# Patient Record
Sex: MALE | Race: WHITE | Employment: OTHER | URBAN - METROPOLITAN AREA
[De-identification: names, ages, dates, MRNs, and addresses within clinical notes are randomized per-mention and may not be internally consistent; named-entity substitution may affect disease eponyms.]

---

## 2017-03-08 PROBLEM — N17.9 AKI (ACUTE KIDNEY INJURY) (HCC): Status: ACTIVE | Noted: 2017-03-08
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
## 2017-03-08 PROBLEM — M62.82 RHABDOMYOLYSIS: Status: ACTIVE | Noted: 2017-03-08
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
## 2019-08-26 ENCOUNTER — APPOINTMENT (OUTPATIENT)
Dept: CT IMAGING | Age: 84
DRG: 309 | End: 2019-08-26
Payer: COMMERCIAL

## 2019-08-26 ENCOUNTER — HOSPITAL ENCOUNTER (INPATIENT)
Age: 84
LOS: 3 days | Discharge: NURSING, REHAB OR LONG-TERM CARE | DRG: 309 | End: 2019-08-29
Attending: EMERGENCY MEDICINE | Admitting: INTERNAL MEDICINE
Payer: COMMERCIAL

## 2019-08-26 DIAGNOSIS — R91.1 PULMONARY NODULE: ICD-10-CM
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
## 2019-08-26 DIAGNOSIS — M47.812 OSTEOARTHRITIS OF CERVICAL SPINE, UNSPECIFIED SPINAL OSTEOARTHRITIS COMPLICATION STATUS: ICD-10-CM

## 2019-08-26 DIAGNOSIS — K76.9 LIVER LESION: ICD-10-CM

## 2019-08-26 DIAGNOSIS — R11.2 NAUSEA AND VOMITING, INTRACTABILITY OF VOMITING NOT SPECIFIED, UNSPECIFIED VOMITING TYPE: ICD-10-CM

## 2019-08-26 DIAGNOSIS — Z86.59 HISTORY OF DEMENTIA: ICD-10-CM

## 2019-08-26 DIAGNOSIS — I48.91 ATRIAL FIBRILLATION WITH RAPID VENTRICULAR RESPONSE (HCC): Primary | ICD-10-CM

## 2019-08-26 PROBLEM — N17.9 AKI (ACUTE KIDNEY INJURY) (HCC): Status: RESOLVED | Noted: 2017-03-08 | Resolved: 2019-08-26

## 2019-08-26 PROBLEM — Z86.73 HISTORY OF CVA (CEREBROVASCULAR ACCIDENT): Chronic | Status: ACTIVE | Noted: 2019-08-26

## 2019-08-26 PROBLEM — N18.30 CKD (CHRONIC KIDNEY DISEASE) STAGE 3, GFR 30-59 ML/MIN (HCC): Chronic | Status: ACTIVE | Noted: 2019-08-26

## 2019-08-26 PROBLEM — M62.82 RHABDOMYOLYSIS: Status: RESOLVED | Noted: 2017-03-08 | Resolved: 2019-08-26

## 2019-08-26 PROBLEM — E03.9 ACQUIRED HYPOTHYROIDISM: Chronic | Status: ACTIVE | Noted: 2019-08-26

## 2019-08-26 PROBLEM — I10 ESSENTIAL HYPERTENSION: Chronic | Status: ACTIVE | Noted: 2019-08-26

## 2019-08-26 PROBLEM — R16.0 LIVER MASS: Status: ACTIVE | Noted: 2019-08-26

## 2019-08-26 LAB
ALBUMIN SERPL-MCNC: 4.1 G/DL (ref 3.5–5.2)
ALP BLD-CCNC: 115 U/L (ref 40–129)
ALT SERPL-CCNC: 22 U/L (ref 0–40)
ANION GAP SERPL CALCULATED.3IONS-SCNC: 14 MMOL/L (ref 7–16)
AST SERPL-CCNC: 36 U/L (ref 0–39)
BACTERIA: ABNORMAL /HPF
BASOPHILS ABSOLUTE: 0.06 E9/L (ref 0–0.2)
BASOPHILS RELATIVE PERCENT: 0.5 % (ref 0–2)
BILIRUB SERPL-MCNC: 0.8 MG/DL (ref 0–1.2)
BILIRUBIN URINE: NEGATIVE
BLOOD, URINE: ABNORMAL
BUN BLDV-MCNC: 26 MG/DL (ref 8–23)
CALCIUM SERPL-MCNC: 11.6 MG/DL (ref 8.6–10.2)
CHLORIDE BLD-SCNC: 96 MMOL/L (ref 98–107)
CHP ED QC CHECK: YES
CLARITY: CLEAR
CO2: 25 MMOL/L (ref 22–29)
COLOR: YELLOW
CREAT SERPL-MCNC: 1.2 MG/DL (ref 0.7–1.2)
EKG ATRIAL RATE: 131 BPM
EKG Q-T INTERVAL: 334 MS
EKG QRS DURATION: 100 MS
EKG QTC CALCULATION (BAZETT): 511 MS
EKG R AXIS: -40 DEGREES
EKG T AXIS: 73 DEGREES
EKG VENTRICULAR RATE: 141 BPM
EOSINOPHILS ABSOLUTE: 0.02 E9/L (ref 0.05–0.5)
EOSINOPHILS RELATIVE PERCENT: 0.2 % (ref 0–6)
GFR AFRICAN AMERICAN: >60
GFR NON-AFRICAN AMERICAN: 56 ML/MIN/1.73
GLUCOSE BLD-MCNC: 159 MG/DL
GLUCOSE BLD-MCNC: 179 MG/DL (ref 74–99)
GLUCOSE URINE: NEGATIVE MG/DL
HCT VFR BLD CALC: 40.5 % (ref 37–54)
HEMOGLOBIN: 13.5 G/DL (ref 12.5–16.5)
IMMATURE GRANULOCYTES #: 0.09 E9/L
IMMATURE GRANULOCYTES %: 0.7 % (ref 0–5)
KETONES, URINE: NEGATIVE MG/DL
LACTIC ACID: 1.7 MMOL/L (ref 0.5–2.2)
LACTIC ACID: 2.3 MMOL/L (ref 0.5–2.2)
LEUKOCYTE ESTERASE, URINE: NEGATIVE
LYMPHOCYTES ABSOLUTE: 1.25 E9/L (ref 1.5–4)
LYMPHOCYTES RELATIVE PERCENT: 9.7 % (ref 20–42)
MCH RBC QN AUTO: 29.8 PG (ref 26–35)
MCHC RBC AUTO-ENTMCNC: 33.3 % (ref 32–34.5)
MCV RBC AUTO: 89.4 FL (ref 80–99.9)
METER GLUCOSE: 159 MG/DL (ref 74–99)
MONOCYTES ABSOLUTE: 0.63 E9/L (ref 0.1–0.95)
MONOCYTES RELATIVE PERCENT: 4.9 % (ref 2–12)
NEUTROPHILS ABSOLUTE: 10.85 E9/L (ref 1.8–7.3)
NEUTROPHILS RELATIVE PERCENT: 84 % (ref 43–80)
NITRITE, URINE: NEGATIVE
PDW BLD-RTO: 15.9 FL (ref 11.5–15)
PH UA: 7 (ref 5–9)
PLATELET # BLD: 285 E9/L (ref 130–450)
PMV BLD AUTO: 10.9 FL (ref 7–12)
POTASSIUM SERPL-SCNC: 3.9 MMOL/L (ref 3.5–5)
PRO-BNP: 552 PG/ML (ref 0–450)
PROTEIN UA: 30 MG/DL
RBC # BLD: 4.53 E12/L (ref 3.8–5.8)
RBC UA: ABNORMAL /HPF (ref 0–2)
SODIUM BLD-SCNC: 135 MMOL/L (ref 132–146)
SPECIFIC GRAVITY UA: 1.01 (ref 1–1.03)
T3 FREE: 2.3 PG/ML (ref 2–4.4)
T4 FREE: 1.8 NG/DL (ref 0.93–1.7)
TOTAL CK: 140 U/L (ref 20–200)
TOTAL PROTEIN: 7.5 G/DL (ref 6.4–8.3)
TROPONIN: 0.03 NG/ML (ref 0–0.03)
TROPONIN: 0.04 NG/ML (ref 0–0.03)
TROPONIN: <0.01 NG/ML (ref 0–0.03)
TSH SERPL DL<=0.05 MIU/L-ACNC: 2.4 UIU/ML (ref 0.27–4.2)
UROBILINOGEN, URINE: 0.2 E.U./DL
WBC # BLD: 12.9 E9/L (ref 4.5–11.5)
WBC UA: ABNORMAL /HPF (ref 0–5)

## 2019-08-26 PROCEDURE — 85025 COMPLETE CBC W/AUTO DIFF WBC: CPT

## 2019-08-26 PROCEDURE — 81001 URINALYSIS AUTO W/SCOPE: CPT

## 2019-08-26 PROCEDURE — 6360000002 HC RX W HCPCS: Performed by: EMERGENCY MEDICINE

## 2019-08-26 PROCEDURE — 84443 ASSAY THYROID STIM HORMONE: CPT

## 2019-08-26 PROCEDURE — 99291 CRITICAL CARE FIRST HOUR: CPT

## 2019-08-26 PROCEDURE — 83605 ASSAY OF LACTIC ACID: CPT

## 2019-08-26 PROCEDURE — 97161 PT EVAL LOW COMPLEX 20 MIN: CPT

## 2019-08-26 PROCEDURE — 36415 COLL VENOUS BLD VENIPUNCTURE: CPT

## 2019-08-26 PROCEDURE — 84481 FREE ASSAY (FT-3): CPT

## 2019-08-26 PROCEDURE — 96374 THER/PROPH/DIAG INJ IV PUSH: CPT

## 2019-08-26 PROCEDURE — 2500000003 HC RX 250 WO HCPCS: Performed by: EMERGENCY MEDICINE

## 2019-08-26 PROCEDURE — 72125 CT NECK SPINE W/O DYE: CPT

## 2019-08-26 PROCEDURE — 97530 THERAPEUTIC ACTIVITIES: CPT

## 2019-08-26 PROCEDURE — 74176 CT ABD & PELVIS W/O CONTRAST: CPT

## 2019-08-26 PROCEDURE — 97165 OT EVAL LOW COMPLEX 30 MIN: CPT

## 2019-08-26 PROCEDURE — 2580000003 HC RX 258: Performed by: EMERGENCY MEDICINE

## 2019-08-26 PROCEDURE — 71250 CT THORAX DX C-: CPT

## 2019-08-26 PROCEDURE — 2060000000 HC ICU INTERMEDIATE R&B

## 2019-08-26 PROCEDURE — APPSS60 APP SPLIT SHARED TIME 46-60 MINUTES: Performed by: NURSE PRACTITIONER

## 2019-08-26 PROCEDURE — 80053 COMPREHEN METABOLIC PANEL: CPT

## 2019-08-26 PROCEDURE — 84484 ASSAY OF TROPONIN QUANT: CPT

## 2019-08-26 PROCEDURE — 6370000000 HC RX 637 (ALT 250 FOR IP): Performed by: INTERNAL MEDICINE

## 2019-08-26 PROCEDURE — 70450 CT HEAD/BRAIN W/O DYE: CPT

## 2019-08-26 PROCEDURE — 93005 ELECTROCARDIOGRAM TRACING: CPT | Performed by: EMERGENCY MEDICINE

## 2019-08-26 PROCEDURE — 84439 ASSAY OF FREE THYROXINE: CPT

## 2019-08-26 PROCEDURE — 94760 N-INVAS EAR/PLS OXIMETRY 1: CPT

## 2019-08-26 PROCEDURE — 2580000003 HC RX 258: Performed by: INTERNAL MEDICINE

## 2019-08-26 PROCEDURE — 99222 1ST HOSP IP/OBS MODERATE 55: CPT | Performed by: EMERGENCY MEDICINE

## 2019-08-26 PROCEDURE — 99223 1ST HOSP IP/OBS HIGH 75: CPT | Performed by: INTERNAL MEDICINE

## 2019-08-26 PROCEDURE — 82550 ASSAY OF CK (CPK): CPT

## 2019-08-26 PROCEDURE — 93010 ELECTROCARDIOGRAM REPORT: CPT | Performed by: INTERNAL MEDICINE

## 2019-08-26 PROCEDURE — 87088 URINE BACTERIA CULTURE: CPT

## 2019-08-26 PROCEDURE — 83880 ASSAY OF NATRIURETIC PEPTIDE: CPT

## 2019-08-26 PROCEDURE — 6360000002 HC RX W HCPCS: Performed by: INTERNAL MEDICINE

## 2019-08-26 PROCEDURE — 82962 GLUCOSE BLOOD TEST: CPT

## 2019-08-26 PROCEDURE — 96375 TX/PRO/DX INJ NEW DRUG ADDON: CPT

## 2019-08-26 RX ORDER — IBUPROFEN 200 MG
200 TABLET ORAL EVERY 6 HOURS PRN
COMMUNITY

## 2019-08-26 RX ORDER — ACETAMINOPHEN 325 MG/1
650 TABLET ORAL EVERY 4 HOURS PRN
Status: DISCONTINUED | OUTPATIENT
Start: 2019-08-26 | End: 2019-08-29 | Stop reason: HOSPADM

## 2019-08-26 RX ORDER — ONDANSETRON 2 MG/ML
4 INJECTION INTRAMUSCULAR; INTRAVENOUS EVERY 6 HOURS PRN
Status: DISCONTINUED | OUTPATIENT
Start: 2019-08-26 | End: 2019-08-29 | Stop reason: HOSPADM

## 2019-08-26 RX ORDER — SODIUM CHLORIDE 0.9 % (FLUSH) 0.9 %
10 SYRINGE (ML) INJECTION EVERY 12 HOURS SCHEDULED
Status: DISCONTINUED | OUTPATIENT
Start: 2019-08-26 | End: 2019-08-29 | Stop reason: HOSPADM

## 2019-08-26 RX ORDER — METOPROLOL TARTRATE 50 MG/1
50 TABLET, FILM COATED ORAL 2 TIMES DAILY
Status: DISCONTINUED | OUTPATIENT
Start: 2019-08-26 | End: 2019-08-28

## 2019-08-26 RX ORDER — FUROSEMIDE 40 MG/1
40 TABLET ORAL DAILY
COMMUNITY

## 2019-08-26 RX ORDER — DILTIAZEM HYDROCHLORIDE 5 MG/ML
20 INJECTION INTRAVENOUS ONCE
Status: COMPLETED | OUTPATIENT
Start: 2019-08-26 | End: 2019-08-26

## 2019-08-26 RX ORDER — SODIUM CHLORIDE 0.9 % (FLUSH) 0.9 %
10 SYRINGE (ML) INJECTION PRN
Status: DISCONTINUED | OUTPATIENT
Start: 2019-08-26 | End: 2019-08-29 | Stop reason: HOSPADM

## 2019-08-26 RX ORDER — LEVOTHYROXINE SODIUM 0.07 MG/1
75 TABLET ORAL DAILY
Status: DISCONTINUED | OUTPATIENT
Start: 2019-08-26 | End: 2019-08-29 | Stop reason: HOSPADM

## 2019-08-26 RX ORDER — LEVOTHYROXINE SODIUM 0.12 MG/1
125 TABLET ORAL DAILY
COMMUNITY

## 2019-08-26 RX ORDER — ASPIRIN 81 MG/1
81 TABLET ORAL DAILY
Status: DISCONTINUED | OUTPATIENT
Start: 2019-08-26 | End: 2019-08-29 | Stop reason: HOSPADM

## 2019-08-26 RX ORDER — TRIAMCINOLONE ACETONIDE 1 MG/G
CREAM TOPICAL 2 TIMES DAILY
COMMUNITY

## 2019-08-26 RX ORDER — 0.9 % SODIUM CHLORIDE 0.9 %
500 INTRAVENOUS SOLUTION INTRAVENOUS ONCE
Status: COMPLETED | OUTPATIENT
Start: 2019-08-26 | End: 2019-08-26

## 2019-08-26 RX ORDER — ONDANSETRON 2 MG/ML
4 INJECTION INTRAMUSCULAR; INTRAVENOUS ONCE
Status: COMPLETED | OUTPATIENT
Start: 2019-08-26 | End: 2019-08-26

## 2019-08-26 RX ADMIN — SODIUM CHLORIDE 500 ML: 9 INJECTION, SOLUTION INTRAVENOUS at 07:58

## 2019-08-26 RX ADMIN — ENOXAPARIN SODIUM 80 MG: 80 INJECTION SUBCUTANEOUS at 13:22

## 2019-08-26 RX ADMIN — DILTIAZEM HYDROCHLORIDE 5 MG/HR: 5 INJECTION INTRAVENOUS at 10:45

## 2019-08-26 RX ADMIN — METOPROLOL TARTRATE 50 MG: 50 TABLET ORAL at 19:54

## 2019-08-26 RX ADMIN — METOPROLOL TARTRATE 50 MG: 50 TABLET ORAL at 13:22

## 2019-08-26 RX ADMIN — ONDANSETRON 4 MG: 2 INJECTION INTRAMUSCULAR; INTRAVENOUS at 08:04

## 2019-08-26 RX ADMIN — Medication 10 ML: at 19:54

## 2019-08-26 RX ADMIN — ENOXAPARIN SODIUM 80 MG: 80 INJECTION SUBCUTANEOUS at 19:54

## 2019-08-26 RX ADMIN — DILTIAZEM HYDROCHLORIDE 20 MG: 5 INJECTION INTRAVENOUS at 09:45

## 2019-08-26 RX ADMIN — LEVOTHYROXINE SODIUM 75 MCG: 75 TABLET ORAL at 13:22

## 2019-08-26 ASSESSMENT — PAIN SCALES - GENERAL
PAINLEVEL_OUTOF10: 0
PAINLEVEL_OUTOF10: 0

## 2019-08-26 NOTE — PROGRESS NOTES
Database complete. Medications reconciled. Care plans and education initiated. Uses cane for assistive device normally.

## 2019-08-26 NOTE — CARE COORDINATION
Social work / Discharge Planning:        Patient is from [address] Awaiting PT/OT evaluations to determine if patient can return to AL level of care. Social work will follow with CM.   Electronically signed by FERMIN Yin on 8/26/2019 at 2:57 PM

## 2019-08-26 NOTE — PROGRESS NOTES
Patient reports he has assist as needed with ADLs. Reoriented to place and situation. Ambulated to/from bathroom Min A,w/walker. Commode xfer Min A (using grab bar). SBA with toileting. Standing at sink SBA - washed hands, able to comb hair seated EOB. Patient fatigued with activity - returned to bed   Upon arrival, patient lying in bed . At end of session, patient returned to bed  with call light and phone within reach, all lines and tubes intact. Pt would benefit from continued skilled OT to increase safety and independence with completion of ADL/IADL tasks for functional independence and quality of life. ALARM ON    Eval Complexity: Low    Assessment of current deficits   Functional mobility [x]  ADLs [x] Strength [x]  Cognition []  Functional transfers  [x] IADLs [x] Safety Awareness [x]  Endurance [x]  Fine Motor Coordination [] Balance [x] Vision/perception [] Sensation []   Gross Motor Coordination [] ROM [] Delirium []                  Motor Control []    Plan of Care:   ADL retraining [x]   Equipment needs [x]   Neuromuscular re-education [x] Energy Conservation Techniques [x]  Functional Transfer training [x] Patient and/or Family Education [x]  Functional Mobility training [x]  Environmental Modifications [x]  Cognitive re-training []   Compensatory techniques for ADLs [x]  Splinting Needs []   Positioning to improve overall function [x]   Therapeutic Activity [x]  Therapeutic Exercise  [x]  Visual/Perceptual: []    Delirium prevention/treatment  []   Other:  []    Rehab Potential: Good for established goals     Patient / Family Goal: return home       Patient and/or family were instructed on functional diagnosis, prognosis/goals and OT plan of care. Demonstrated fair understanding. Continue to reinforce     Low Evaluation + 15 timed tx minutes  Tx Time in: 1410  Tx Time out: 1425    Evaluation time includes thorough review of current medical information, gathering information on past medical history/social history and prior level of function, completion of standardized testing/informal observation of tasks, assessment of data, and development of POC/Goals      Jannet Peace OTR/L [id number]

## 2019-08-26 NOTE — CONSULTS
intubation. Palliative medicine will continue for goals of care and CODE STATUS as the patient's condition changes. Further discussions with the [address] [place] [address] will be needed. Time/Communication  Time In: 1435  Time Out: 1550    Greater than 51% of time spent, total 75 minutes in counseling and coordination of care at the bedside regardinggoals of care, diagnosis and prognosis and see above. Discharge planning: to be determined    Referrals to: none today    Discussed patient and the plan of care with the other IDT members of Palliative Care Team and with Durable Power of  for Healthcare and floor nurse. Symptom Management:    Symptom Medications Number Doses in Past 24 hrs   Pain  patient denying any pain or symptoms at this time. Nausea/vomiting     Bowel Regime/constipation     Anxiety/agitation/depression     SOB     Appetite       Current Medications:  Inpatient medications reviewed: yes. Home Medications reviewed:  yes. Subjective:   Hospital days prior to  consult: Hospital Day: 1    Subjective/Events: This 70-year-old  male with a past medical history of hypertension, hypothyroidism, dementia, vascular heart disease, atrial fibrillation with rapid ventricular response, presented from [facility] where he was found down on the floor by staff. The patient is unable to tell me what happened. There is no other information in the patient's current medical record reflecting what had occurred at the [facility]. The Durable Power of  for [facility] also does not know what it happened. They did report at the [facility], that the patient did have nonbloody emesis. CT scan of the abdomen revealed what appears to be multiple infiltrative hypodense lesions of the liver with questionable wide spread hepatic malignancies as well as a subpleural 9 mm nodule in the left lung base.   The patient is denying any headache or blurred vision,

## 2019-08-26 NOTE — ED PROVIDER NOTES
droop noted. Skin: Skin is warm and dry. Nursing note and vitals reviewed. Procedures     MDM  Number of Diagnoses or Management Options  Atrial fibrillation with rapid ventricular response ([facility] [address]):   Fall, initial encounter:   History of dementia:   Liver lesion:   Nausea and vomiting, intractability of vomiting not specified, unspecified vomiting type:   Osteoarthritis of cervical spine, unspecified spinal osteoarthritis complication status:   Pulmonary nodule:   Diagnosis management comments: Patient arrived from SNF after he was found on the floor. Patient is found to be in new onset atrial fibrillation with RVR. After IV fluids, patient is rate controlled with a loading dose of Cardizem and then placed on Cardizem drip. He remained neurovascularly intact. He remained alert while in the ED. He has history of dementia and per family seems to be at his baseline. Cardiology consult was placed. He is admitted in stable condition for continued work-up, treatment, and monitoring of his conditions. EKG: This EKG is signed and interpreted by me. Rate: 141  Rhythm: Atrial fibrillation  Interpretation: A-fib w/RVR; non-specific ST changes  Comparison: changes compared to previous EKG            --------------------------------------------- PAST HISTORY ---------------------------------------------  Past Medical History:  has a past medical history of Dizziness, Hypertension, and Thyroid disease. Past Surgical History:  has a past surgical history that includes joint replacement (1984, 1992, 1996) and shoulder surgery (2006). Social History:  reports that he quit smoking about 62 years ago. He has a 60.00 pack-year smoking history. He has never used smokeless tobacco. He reports that he does not drink alcohol or use drugs. Family History: family history is not on file. The patients home medications have been reviewed.     Allergies:

## 2019-08-27 LAB
ALBUMIN SERPL-MCNC: 3.2 G/DL (ref 3.5–5.2)
ALP BLD-CCNC: 106 U/L (ref 40–129)
ALT SERPL-CCNC: 21 U/L (ref 0–40)
ANION GAP SERPL CALCULATED.3IONS-SCNC: 11 MMOL/L (ref 7–16)
AST SERPL-CCNC: 43 U/L (ref 0–39)
BASOPHILS ABSOLUTE: 0.06 E9/L (ref 0–0.2)
BASOPHILS RELATIVE PERCENT: 0.5 % (ref 0–2)
BILIRUB SERPL-MCNC: 0.7 MG/DL (ref 0–1.2)
BUN BLDV-MCNC: 22 MG/DL (ref 8–23)
CALCIUM SERPL-MCNC: 11.5 MG/DL (ref 8.6–10.2)
CHLORIDE BLD-SCNC: 97 MMOL/L (ref 98–107)
CO2: 25 MMOL/L (ref 22–29)
CREAT SERPL-MCNC: 1.3 MG/DL (ref 0.7–1.2)
EOSINOPHILS ABSOLUTE: 0.09 E9/L (ref 0.05–0.5)
EOSINOPHILS RELATIVE PERCENT: 0.7 % (ref 0–6)
GFR AFRICAN AMERICAN: >60
GFR NON-AFRICAN AMERICAN: 51 ML/MIN/1.73
GLUCOSE BLD-MCNC: 112 MG/DL (ref 74–99)
HCT VFR BLD CALC: 39.3 % (ref 37–54)
HEMOGLOBIN: 13.1 G/DL (ref 12.5–16.5)
IMMATURE GRANULOCYTES #: 0.05 E9/L
IMMATURE GRANULOCYTES %: 0.4 % (ref 0–5)
LV EF: 60 %
LVEF MODALITY: NORMAL
LYMPHOCYTES ABSOLUTE: 2.83 E9/L (ref 1.5–4)
LYMPHOCYTES RELATIVE PERCENT: 22.2 % (ref 20–42)
MAGNESIUM: 1.8 MG/DL (ref 1.6–2.6)
MCH RBC QN AUTO: 30.1 PG (ref 26–35)
MCHC RBC AUTO-ENTMCNC: 33.3 % (ref 32–34.5)
MCV RBC AUTO: 90.3 FL (ref 80–99.9)
MONOCYTES ABSOLUTE: 0.82 E9/L (ref 0.1–0.95)
MONOCYTES RELATIVE PERCENT: 6.4 % (ref 2–12)
NEUTROPHILS ABSOLUTE: 8.9 E9/L (ref 1.8–7.3)
NEUTROPHILS RELATIVE PERCENT: 69.8 % (ref 43–80)
PDW BLD-RTO: 16 FL (ref 11.5–15)
PLATELET # BLD: 284 E9/L (ref 130–450)
PMV BLD AUTO: 10.5 FL (ref 7–12)
POTASSIUM SERPL-SCNC: 4.2 MMOL/L (ref 3.5–5)
RBC # BLD: 4.35 E12/L (ref 3.8–5.8)
SODIUM BLD-SCNC: 133 MMOL/L (ref 132–146)
TOTAL PROTEIN: 6.9 G/DL (ref 6.4–8.3)
WBC # BLD: 12.8 E9/L (ref 4.5–11.5)

## 2019-08-27 PROCEDURE — 6360000002 HC RX W HCPCS: Performed by: INTERNAL MEDICINE

## 2019-08-27 PROCEDURE — 97530 THERAPEUTIC ACTIVITIES: CPT

## 2019-08-27 PROCEDURE — 2580000003 HC RX 258: Performed by: INTERNAL MEDICINE

## 2019-08-27 PROCEDURE — 6370000000 HC RX 637 (ALT 250 FOR IP): Performed by: INTERNAL MEDICINE

## 2019-08-27 PROCEDURE — 36415 COLL VENOUS BLD VENIPUNCTURE: CPT

## 2019-08-27 PROCEDURE — 80053 COMPREHEN METABOLIC PANEL: CPT

## 2019-08-27 PROCEDURE — 93306 TTE W/DOPPLER COMPLETE: CPT

## 2019-08-27 PROCEDURE — 83735 ASSAY OF MAGNESIUM: CPT

## 2019-08-27 PROCEDURE — 2060000000 HC ICU INTERMEDIATE R&B

## 2019-08-27 PROCEDURE — 99232 SBSQ HOSP IP/OBS MODERATE 35: CPT | Performed by: INTERNAL MEDICINE

## 2019-08-27 PROCEDURE — 85025 COMPLETE CBC W/AUTO DIFF WBC: CPT

## 2019-08-27 PROCEDURE — 97535 SELF CARE MNGMENT TRAINING: CPT

## 2019-08-27 PROCEDURE — 6360000004 HC RX CONTRAST MEDICATION: Performed by: NURSE PRACTITIONER

## 2019-08-27 RX ORDER — AMLODIPINE BESYLATE 5 MG/1
5 TABLET ORAL DAILY
Status: DISCONTINUED | OUTPATIENT
Start: 2019-08-27 | End: 2019-08-29

## 2019-08-27 RX ADMIN — Medication 10 ML: at 21:30

## 2019-08-27 RX ADMIN — METOPROLOL TARTRATE 50 MG: 50 TABLET ORAL at 09:11

## 2019-08-27 RX ADMIN — ASPIRIN 81 MG: 81 TABLET, COATED ORAL at 09:11

## 2019-08-27 RX ADMIN — AMLODIPINE BESYLATE 5 MG: 5 TABLET ORAL at 13:49

## 2019-08-27 RX ADMIN — ENOXAPARIN SODIUM 80 MG: 80 INJECTION SUBCUTANEOUS at 09:10

## 2019-08-27 RX ADMIN — ENOXAPARIN SODIUM 80 MG: 80 INJECTION SUBCUTANEOUS at 21:30

## 2019-08-27 RX ADMIN — METOPROLOL TARTRATE 50 MG: 50 TABLET ORAL at 21:29

## 2019-08-27 RX ADMIN — PERFLUTREN 1.65 MG: 6.52 INJECTION, SUSPENSION INTRAVENOUS at 08:50

## 2019-08-27 RX ADMIN — Medication 10 ML: at 09:11

## 2019-08-27 RX ADMIN — LEVOTHYROXINE SODIUM 75 MCG: 75 TABLET ORAL at 05:48

## 2019-08-27 ASSESSMENT — PAIN SCALES - GENERAL
PAINLEVEL_OUTOF10: 0
PAINLEVEL_OUTOF10: 0

## 2019-08-27 NOTE — PLAN OF CARE
Problem:  Activity:  Goal: Ability to tolerate increased activity will improve  Description  Ability to tolerate increased activity will improve  8/27/2019 0036 by Selma Dubose RN  Outcome: Not Met This Shift  Goal: Ability to implement measures to reduce episodes of fatigue will improve  Description  Ability to implement measures to reduce episodes of fatigue will improve  8/27/2019 0036 by Selma Dubose RN  Outcome: Not Met This Shift  Goal: Expression of feelings of increased energy will increase  Description  Expression of feelings of increased energy will increase  8/27/2019 0036 by Selma Dubose RN  Outcome: Not Met This Shift     Problem: Safety:  Goal: Ability to remain free from injury will improve  Description  Ability to remain free from injury will improve  8/27/2019 0036 by Selma Dubose RN  Outcome: Met This Shift

## 2019-08-27 NOTE — PROGRESS NOTES
suspension 30 mL  30 mL Oral Daily PRN Tony Nash MD        ondansetron [facility]) injection 4 mg  4 mg Intravenous Q6H PRN Tony Nash MD        metoprolol tartrate (LOPRESSOR) tablet 50 mg  50 mg Oral BID Tony Nash MD   50 mg at 08/27/19 0911    acetaminophen (TYLENOL) tablet 650 mg  650 mg Oral Q4H PRN Tony Nash MD        enoxaparin (LOVENOX) injection 80 mg  1 mg/kg Subcutaneous BID Tony Nash MD   80 mg at 08/27/19 0910      diltiazem (CARDIZEM) 100 mg in 100 mL infusion Stopped (08/26/19 1825)       Physical Exam:  BP (!) 189/82   Pulse 59   Temp 98.7 °F (37.1 °C) (Axillary)   Resp 16   Ht 6' 1\" (1.854 m)   Wt 193 lb 6.4 oz (87.7 kg)   SpO2 94%   BMI 25.52 kg/m²   Wt Readings from Last 3 Encounters:   08/27/19 193 lb 6.4 oz (87.7 kg)   03/09/17 171 lb 4.8 oz (77.7 kg)   11/07/14 187 lb (84.8 kg)     Appearance: Awake, alert, no acute respiratory distress  Skin: Intact, no rash  Head: Normocephalic, atraumatic  Eyes: EOMI, no conjunctival erythema  ENMT: No pharyngeal erythema, MMM, no rhinorrhea  Neck: Supple, no elevated JVP, no carotid bruits  Lungs: Clear to auscultation bilaterally. No wheezes, rales, or rhonchi.   Cardiac: Regular rate and rhythm, +S1S2, no murmurs apparent  Abdomen: Soft, nontender, +bowel sounds  Extremities: Moves all extremities x 4, no lower extremity edema  Neurologic: No focal motor deficits apparent, normal mood and affect  Peripheral Pulses: Intact posterior tibial pulses bilaterally    Intake/Output:    Intake/Output Summary (Last 24 hours) at 8/27/2019 0951  Last data filed at 8/27/2019 0911  Gross per 24 hour   Intake 370 ml   Output 1150 ml   Net -780 ml     I/O this shift:  In: 10 [I.V.:10]  Out: 100 [Urine:100]    Laboratory Tests:  Recent Labs     08/26/19  0745 08/26/19  0755 08/27/19  0550   NA  --  135 133   K  --  3.9 4.2   CL  --  96* 97*   CO2  --  25 25   BUN  --  26* 22   CREATININE  --  1.2 1.3*   GLUCOSE 159 179* 112*   CALCIUM

## 2019-08-27 NOTE — H&P
Recent Labs     08/26/19  0755 08/27/19  0550   WBC 12.9* 12.8*   HGB 13.5 13.1    284     Recent Labs     08/26/19  0755 08/27/19  0550    133   K 3.9 4.2   BUN 26* 22   CREATININE 1.2 1.3*     Recent Labs     08/26/19  0755 08/27/19  0550   PROT 7.5 6.9     Recent Labs     08/26/19  0755 08/27/19  0550   AST 36 43*   ALT 22 21   ALKPHOS 115 106   BILITOT 0.8 0.7     No results for input(s): BNP in the last 72 hours. Recent Labs     08/26/19  0755 08/26/19  1325 08/26/19  1709   CKTOTAL 140  --   --    TROPONINI <0.01 0.04* 0.03       ASSESSMENT:      Principal Problem:    Atrial fibrillation with rapid ventricular response (HCC)  Active Problems:    Acquired hypothyroidism    Essential hypertension    CKD (chronic kidney disease) stage 3, GFR 30-59 ml/min (HCC)    History of CVA (cerebrovascular accident)    Liver lesion    Nausea and vomiting    Fall    Pulmonary nodule    Goals of care, counseling/discussion    Palliative care encounter  Resolved Problems:    * No resolved hospital problems.  *        PLAN:    Admitted to tele for evaluation of atrial fib rvr with mets on ct abdomen   Rate control with bb , No oac d/t advanced age   Would continue palliation and supportive care   Norvasc added for bp control   Prn iv hydral  Continue to monitor   Cards and onc  Following   For SNF nce accepted (not safe for AL )    Dc plan back to facility 24 hrs     Maria A Alvarado MD  8/27/2019  4:22 PM

## 2019-08-27 NOTE — PLAN OF CARE
Problem: HH FLUID RETENTION-CHF  Goal: Absence of fluid overload signs and symptoms  8/27/2019 0036 by Ezekiel Weir RN  Outcome: Met This Shift  8/26/2019 1211 by Lael Krabbe, RN  Outcome: Ongoing     Problem: FALL RISK  Goal: Absence of falls  8/27/2019 0036 by Ezekiel Weir RN  Outcome: Met This Shift  8/26/2019 1211 by Lael Krabbe, RN  Outcome: Met This Shift     Problem: Nausea/Vomiting:  Goal: Absence of nausea/vomiting  8/27/2019 0036 by Ezekiel Weir RN  Outcome: Met This Shift  8/26/2019 1211 by Lael Krabbe, RN  Outcome: Ongoing     Problem: Coping:  Goal: Level of anxiety will decrease  Description  Level of anxiety will decrease  Outcome: Met This Shift  Goal: General experience of comfort will improve  Description  General experience of comfort will improve  Outcome: Met This Shift     Problem: Safety:  Goal: Ability to remain free from injury will improve  Description  Ability to remain free from injury will improve  Outcome: Met This Shift     Problem: Cardiac:  Goal: Ability to maintain an adequate cardiac output will improve  Description  Ability to maintain an adequate cardiac output will improve  Outcome: Met This Shift  Goal: Complications related to the disease process, condition or treatment will be avoided or minimized  Description  Complications related to the disease process, condition or treatment will be avoided or minimized  Outcome: Met This Shift

## 2019-08-28 LAB — URINE CULTURE, ROUTINE: NORMAL

## 2019-08-28 PROCEDURE — 97530 THERAPEUTIC ACTIVITIES: CPT

## 2019-08-28 PROCEDURE — 99232 SBSQ HOSP IP/OBS MODERATE 35: CPT | Performed by: INTERNAL MEDICINE

## 2019-08-28 PROCEDURE — 6360000002 HC RX W HCPCS: Performed by: INTERNAL MEDICINE

## 2019-08-28 PROCEDURE — 6370000000 HC RX 637 (ALT 250 FOR IP): Performed by: INTERNAL MEDICINE

## 2019-08-28 PROCEDURE — 2060000000 HC ICU INTERMEDIATE R&B

## 2019-08-28 PROCEDURE — 2580000003 HC RX 258: Performed by: INTERNAL MEDICINE

## 2019-08-28 PROCEDURE — 6360000002 HC RX W HCPCS: Performed by: PHYSICIAN ASSISTANT

## 2019-08-28 RX ORDER — HYDRALAZINE HYDROCHLORIDE 20 MG/ML
10 INJECTION INTRAMUSCULAR; INTRAVENOUS EVERY 6 HOURS PRN
Status: DISCONTINUED | OUTPATIENT
Start: 2019-08-28 | End: 2019-08-29 | Stop reason: HOSPADM

## 2019-08-28 RX ADMIN — LEVOTHYROXINE SODIUM 75 MCG: 75 TABLET ORAL at 06:41

## 2019-08-28 RX ADMIN — ENOXAPARIN SODIUM 80 MG: 80 INJECTION SUBCUTANEOUS at 21:55

## 2019-08-28 RX ADMIN — ENOXAPARIN SODIUM 80 MG: 80 INJECTION SUBCUTANEOUS at 09:24

## 2019-08-28 RX ADMIN — Medication 10 ML: at 21:59

## 2019-08-28 RX ADMIN — HYDRALAZINE HYDROCHLORIDE 10 MG: 20 INJECTION INTRAMUSCULAR; INTRAVENOUS at 22:07

## 2019-08-28 RX ADMIN — AMLODIPINE BESYLATE 5 MG: 5 TABLET ORAL at 09:24

## 2019-08-28 RX ADMIN — Medication 10 ML: at 09:27

## 2019-08-28 RX ADMIN — ASPIRIN 81 MG: 81 TABLET, COATED ORAL at 09:24

## 2019-08-28 RX ADMIN — METOPROLOL TARTRATE 25 MG: 25 TABLET ORAL at 09:24

## 2019-08-28 RX ADMIN — METOPROLOL TARTRATE 25 MG: 25 TABLET ORAL at 21:59

## 2019-08-28 ASSESSMENT — PAIN SCALES - GENERAL
PAINLEVEL_OUTOF10: 0

## 2019-08-28 NOTE — PROGRESS NOTES
base of support during gait. Patient response to education:   Pt verbalized understanding Pt demonstrated skill Pt requires further education in this area   no With prompt for transfer safety and gait posture yes     Additional Comments: Nurse ok with rx. Pt found in a bedside chair, appeared more alert today, agreed to participate after initial encouragement, was pleasant and cooperative. Family present and with good encouragement as well. Yadi slow, inconsistent, Pt unsteady during all gait, required hands on assist for all balance, was fatigued after activity. Pt deconditioned, unsafe to gait alone, is a strong fall risk without support. Pt was left in a bedside kandice with call light in reach, waffle cushion in place    Chair/bed alarm: chair alarm active    Treatment time: 21 minutes  Time out: 1043    Pt is making fair progress toward established Physical Therapy goals as per increased functional mobility performed  this session. Continue with physical therapy current plan of care.     Blanca Mahajan [facility]   License Number: [id number]

## 2019-08-28 NOTE — PROGRESS NOTES
INPATIENT CARDIOLOGY FOLLOW-UP    Name: Mary Jones    Age: 80 y.o. Date of Admission: 8/26/2019  7:35 AM    Date of Service: 8/28/2019    Chief Complaint: Follow-up for Atrial fibrillation with rapid ventricular response. Interim History:  No new overnight cardiac complaints. Currently with no complaints of CP, SOB, palpitations, dizziness, or lightheadedness. SR on telemetry. Review of Systems:   Cardiac: As per HPI  General: No fever, chills  Pulmonary: As per HPI  HEENT: No visual disturbances, difficult swallowing  GI: No nausea, vomiting  Endocrine: No thyroid disease or DM  Musculoskeletal: ALSTON x 4, no focal motor deficits  Skin: Intact, no rashes  Neuro/Psych: No headache or seizures    Problem List:  Patient Active Problem List   Diagnosis    Acquired hypothyroidism    Essential hypertension    CKD (chronic kidney disease) stage 3, GFR 30-59 ml/min (HCC)    History of CVA (cerebrovascular accident)    Atrial fibrillation with rapid ventricular response (HCC)    Liver lesion    Nausea and vomiting    Fall    Pulmonary nodule    Goals of care, counseling/discussion    Palliative care encounter       Allergies:   Allergies   Allergen Reactions    Iodine        Current Medications:  Current Facility-Administered Medications   Medication Dose Route Frequency Provider Last Rate Last Dose    amLODIPine (NORVASC) tablet 5 mg  5 mg Oral Daily Toni Marcos MD   5 mg at 08/27/19 1349    aspirin EC tablet 81 mg  81 mg Oral Daily Walter Lugo MD   81 mg at 08/27/19 0911    levothyroxine (SYNTHROID) tablet 75 mcg  75 mcg Oral Daily Walter Lugo MD   75 mcg at 08/28/19 7359    sodium chloride flush 0.9 % injection 10 mL  10 mL Intravenous 2 times per day Walter Lugo MD   10 mL at 08/27/19 2130    sodium chloride flush 0.9 % injection 10 mL  10 mL Intravenous PRN Walter Lugo MD        magnesium hydroxide (MILK OF MAGNESIA) 400 MG/5ML suspension 30 mL  30 mL Oral Daily PRN 21   AST 36 43*   PROT 7.5 6.9   BILITOT 0.8 0.7   LABALBU 4.1 3.2*     Recent Labs     08/26/19  0755 08/27/19  0550   WBC 12.9* 12.8*   RBC 4.53 4.35   HGB 13.5 13.1   HCT 40.5 39.3   MCV 89.4 90.3   MCH 29.8 30.1   MCHC 33.3 33.3   RDW 15.9* 16.0*    284   MPV 10.9 10.5     Lab Results   Component Value Date    CKTOTAL 140 08/26/2019    CKMB 3.4 03/09/2017    TROPONINI 0.03 08/26/2019    TROPONINI 0.04 (H) 08/26/2019    TROPONINI <0.01 08/26/2019     No results found for: INR, PROTIME  Lab Results   Component Value Date    TSH 2.400 08/26/2019     Lab Results   Component Value Date    LABA1C 4.9 03/09/2017     No results found for: EAG  Lab Results   Component Value Date    CHOL 120 03/09/2017     Lab Results   Component Value Date    TRIG 55 03/09/2017     Lab Results   Component Value Date    HDL 47 03/09/2017     Lab Results   Component Value Date    LDLCALC 62 03/09/2017     Lab Results   Component Value Date    LABVLDL 11 03/09/2017     No results found for: CHOLHDLRATIO    Cardiac Tests:  Telemetry findings reviewed: SR at rate 40 had an episode of SVT, no further episodes of AF      EKG: Atrial fibrillation with rapid ventricular response, left axis deviation, LVH, nonspecific ST-T changes, abnormal EKG. Labs were reviewed:Bun/creatinine 26/1.2>>22/1.3, electrolytes are normal, calcium 11.6, TSH 2.4, free T4 1.8, WBC 12.8>>12.8, H&H 13.5>>>13.1/39.3 and 40.5, platelets 815     AZM-27/4/1371: LVEF 65%, moderate MR, mild TR, severe AR, mild pulmonary hypertension.     Echo : LVEF 60%, stage II diastolic dysfunction, normal RV size and function, mild MR, moderate AR, mild TR and mild pulmonary hypertension with an RVSP of 47 mmHg.     BP: 189/82>> 168/68    Assessment:  · Paroxysmal atrial fibrillation new onset with rapid ventricular response- converted spontaneously to NSR  · [id number] Vasc score 5, HAS BLED score 3  · Hypertension- not well controlled  · Hypothyroidism on hormone replacement

## 2019-08-28 NOTE — DISCHARGE INSTR - COC
Continuity of Care Form    Patient Name: Pita Solorio   :  1923  MRN:  [id number]    Admit date:  2019  Discharge date:  19    Code Status Order: DNR-CCA   Advance Directives:   Advance Care Flowsheet Documentation     Date/Time Healthcare Directive Type of Healthcare Directive Copy in [address] Agent's Name Healthcare Agent's Phone Number    [phone]  Yes, patient has an advance directive for healthcare treatment  Health care treatment directive; Living will  No, copy requested from family  Adult [address] daughter and Emmanuel Whelan son  [phone] Sari Hinkle / [phone] son          Admitting Physician:  Yoan Voss MD  PCP: Danielle Angela MD    Discharging Nurse: [address] [place] [address] Unit/Room#: 5323/7226-S  Discharging Unit Phone Number: [phone]    Emergency Contact:   Extended Emergency Contact Information  Primary Emergency Contact: Nadia Mcdermott [facility])  Address: [address] [place] [address] Phone: [phone]  Relation: Brother/Sister  Secondary Emergency Contact: Giuliano Triana [facility])  Address: [address] Phone: [phone]  Relation: Child    Past Surgical History:  Past Surgical History:   Procedure Laterality Date    JOINT REPLACEMENT  , [address] [facility], [address] [facility], [address] [facility], [address] [facility] [address] [facility] [address] (Out Patient)   [facility]         Immunization History:   Immunization History   Administered Date(s) Administered    Influenza Virus Vaccine 10/05/2018       Active Problems:  Patient Active Problem List   Diagnosis Code    Acquired hypothyroidism E03.9    Essential hypertension I10    CKD (chronic kidney disease) stage 3, GFR 30-59 ml/min ([facility]) N18.3    History of CVA (cerebrovascular accident) Z80.78    Atrial fibrillation with rapid ventricular response ([address] I48.91    Liver lesion K76.9    Nausea and vomiting R11.2    Fall W19. Rekha Velasco Pulmonary nodule R91.1    Goals of care, counseling/discussion Z71.89    Palliative care encounter Z51.5       Isolation/Infection:   Isolation          No Isolation            Nurse Assessment:  Last Vital Signs: BP (!) 160/54   Pulse (!) 48   Temp 97.7 °F (36.5 °C) (Oral)   Resp 18   Ht 6' 1\" (1.854 m)   Wt 198 lb (89.8 kg)   SpO2 98%   BMI 26.12 kg/m²     Last documented pain score (0-10 scale): Pain Level: 0  Last Weight:   Wt Readings from Last 1 Encounters:   08/28/19 198 lb (89.8 kg)     Mental Status:  alert    IV Access:  - None    Nursing Mobility/ADLs:  Walking   Assisted  Transfer  Assisted  Bathing  Assisted  Dressing  Assisted  Toileting  Assisted  Feeding  Assisted  Med Admin  Assisted  Med Delivery   whole    Wound Care Documentation and Therapy:        Elimination:  Continence:   · Bowel: No  · Bladder: No  Urinary Catheter: None   Colostomy/Ileostomy/Ileal Conduit: No       Date of Last BM: 8/28/19    Intake/Output Summary (Last 24 hours) at 8/28/2019 1227  Last data filed at 8/28/2019 0442  Gross per 24 hour   Intake 120 ml   Output 50 ml   Net 70 ml     I/O last 3 completed shifts: In: 130 [P.O.:120; I.V.:10]  Out: 150 [Urine:150]    Safety Concerns: At Risk for Falls    Impairments/Disabilities:      Hearing    Nutrition Therapy:  Current Nutrition Therapy:   - Oral Diet:  General    Routes of Feeding: Oral  Liquids: No Restrictions  Daily Fluid Restriction: no  Last Modified Barium Swallow with Video (Video Swallowing Test): not done    Treatments at the Time of Hospital Discharge:   Respiratory Treatments: ***  Oxygen Therapy:  is not on home oxygen therapy.   Ventilator:    - No ventilator support    Rehab Therapies: Physical Therapy and Occupational Therapy  Weight Bearing Status/Restrictions: No weight bearing restirctions  Other Medical Equipment (for information only, NOT a DME order):  wheelchair and walker  Other Treatments: ***    Patient's personal belongings (please select all that are sent with patient):  {[facility] DME Belongings:[id number]}    RN SIGNATURE:  Electronically signed by Yary Prado RN on 8/29/19 at 12:27 PM    CASE MANAGEMENT/SOCIAL WORK SECTION    Inpatient Status Date: ***    Readmission Risk Assessment Score:  Readmission Risk              Risk of Unplanned Readmission:        8           Discharging to Facility/ Agency   · Name: [address] [place] [address]  · [address] [place] [address]  · XODXD:[phone]  · Fax:[phone]    Dialysis Facility (if applicable)   · Name:  · Address:  · Dialysis Schedule:  · Phone:  · Fax:    / signature: Electronically signed by FERMIN Lopez on 8/28/2019 at 4:23 PM    PHYSICIAN SECTION    Prognosis: Guarded    Condition at Discharge: Stable    Rehab Potential (if transferring to Rehab): Fair    Recommended Labs or Other Treatments After Discharge: cbc and bmp in 3 days     Physician Certification: I certify the above information and transfer of Connor Hollins  is necessary for the continuing treatment of the diagnosis listed and that he requires skilled for less 30 days.      Update Admission H&P: No change in H&P    PHYSICIAN SIGNATURE:  Electronically signed by Jayne Remy MD on 8/28/19 at 12:27 PM

## 2019-08-28 NOTE — PROGRESS NOTES
Subjective: The patient is awake and alert. No acute events overnight. Denies chest pain, angina, SOB     Objective:    BP (!) 160/54   Pulse (!) 48   Temp 97.7 °F (36.5 °C) (Oral)   Resp 18   Ht 6' 1\" (1.854 m)   Wt 198 lb (89.8 kg)   SpO2 98%   BMI 26.12 kg/m²     In: 120 [P.O.:120]  Out: 50     HEENT: NCAT,  PERRLA, No JVD  Heart:  RRR, no murmurs, gallops, or rubs.   Lungs:  CTA bilaterally, no wheeze, rales or rhonchi  Abd: bowel sounds present, nontender, nondistended, no masses  Extrem:  No clubbing, cyanosis, or edema     Recent Labs     08/26/19  0755 08/27/19  0550   WBC 12.9* 12.8*   HGB 13.5 13.1   HCT 40.5 39.3    284       Recent Labs     08/26/19  0755 08/27/19  0550    133   K 3.9 4.2   CL 96* 97*   CO2 25 25   BUN 26* 22   CREATININE 1.2 1.3*   CALCIUM 11.6* 11.5*       Assessment:    Patient Active Problem List   Diagnosis    Acquired hypothyroidism    Essential hypertension    CKD (chronic kidney disease) stage 3, GFR 30-59 ml/min (HCC)    History of CVA (cerebrovascular accident)    Atrial fibrillation with rapid ventricular response (HCC)    Liver lesion    Nausea and vomiting    Fall    Pulmonary nodule    Goals of care, counseling/discussion    Palliative care encounter       Plan:  Admitted to [facility] for evaluation of atrial fib rvr with mets on ct abdomen   Rate control with bb , No oac d/t advanced age   Dose of bb decreased d/t bradycardia overnight   Would continue palliation and supportive care   Norvasc added for bp control - increase dose   Prn iv hydral  Continue to monitor   Cards and onc  Following   For SNF once accepted (not safe for AL )    Dc plan back to facility today if able    DVT Prophylaxis   PT/OT  Discharge planning       All consultants notes reviewed    Ibrahima Griffith MD  11:46 AM  8/28/2019

## 2019-08-28 NOTE — DISCHARGE SUMMARY
19  0755 19  0550   WBC 12.9* 12.8*   HGB 13.5 13.1   HCT 40.5 39.3    284       Recent Labs     19  0755 19  0550    133   K 3.9 4.2   CL 96* 97*   CO2 25 25   BUN 26* 22   CREATININE 1.2 1.3*   CALCIUM 11.6* 11.5*       Ct Abdomen Pelvis Wo Contrast Additional Contrast? None    Result Date: 2019  Patient MRN:  [id number] : 1923 Age: 80 years Gender: Male Order Date:  2019 8:00 AM EXAM: CT ABDOMEN PELVIS WO CONTRAST, CT CHEST WO CONTRAST number of images [id number] Technique: Low-dose CT  acquisition technique included one of following options; 1 . Automated exposure control, 2. Adjustment of MA and or KV according to patient's size or 3. Use of iterative reconstruction. INDICATION:  ABDOMINAL PAIN  COMPARISON: None FINDINGS: Chest. There is cardiomegaly. There is mild ectasia of the ascending thoracic aorta measuring 3.7 cm. There is severe coronary artery calcifications. Small nonspecific lymph nodes are identified in the mediastinum. The trachea and major bronchi are patent. There is mild COPD with minimal atelectasis/infiltrates in the lung bases. A 9 mm subpleural nodule in the left lower lobe is noted. Degenerative changes are identified in the thoracic and lumbar spine. Abdomen. Lack of contrast limits evaluation of solid organs. Given this limitation, there are multiple ill-defined infiltrative hypodense lesions in the liver with the largest conglomeration in the left hepatic lobe measuring 12 x 8 cm with additional smaller 1's in the right hepatic lobe concerning for malignancy. Gallbladder is partially distended. Small to moderate lymph nodes are identified in the upper abdomen with lymph nodes measuring up to 8 mm in the portocaval region and abby of the liver/gastrohepatic ligament. Spleen, pancreas, the adrenals and the kidneys are normal except for multiple peripelvic and cortical cysts in the kidneys.  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

## 2019-08-29 VITALS
BODY MASS INDEX: 25.49 KG/M2 | HEART RATE: 56 BPM | DIASTOLIC BLOOD PRESSURE: 54 MMHG | WEIGHT: 192.3 LBS | HEIGHT: 73 IN | TEMPERATURE: 97.7 F | SYSTOLIC BLOOD PRESSURE: 133 MMHG | OXYGEN SATURATION: 97 % | RESPIRATION RATE: 18 BRPM

## 2019-08-29 LAB
ANION GAP SERPL CALCULATED.3IONS-SCNC: 8 MMOL/L (ref 7–16)
BUN BLDV-MCNC: 33 MG/DL (ref 8–23)
CALCIUM SERPL-MCNC: 11.9 MG/DL (ref 8.6–10.2)
CHLORIDE BLD-SCNC: 101 MMOL/L (ref 98–107)
CO2: 28 MMOL/L (ref 22–29)
CREAT SERPL-MCNC: 1.4 MG/DL (ref 0.7–1.2)
GFR AFRICAN AMERICAN: 57
GFR NON-AFRICAN AMERICAN: 47 ML/MIN/1.73
GLUCOSE BLD-MCNC: 113 MG/DL (ref 74–99)
POTASSIUM SERPL-SCNC: 3.9 MMOL/L (ref 3.5–5)
SODIUM BLD-SCNC: 137 MMOL/L (ref 132–146)

## 2019-08-29 PROCEDURE — 2580000003 HC RX 258: Performed by: INTERNAL MEDICINE

## 2019-08-29 PROCEDURE — 6370000000 HC RX 637 (ALT 250 FOR IP): Performed by: INTERNAL MEDICINE

## 2019-08-29 PROCEDURE — 6360000002 HC RX W HCPCS: Performed by: INTERNAL MEDICINE

## 2019-08-29 PROCEDURE — 80048 BASIC METABOLIC PNL TOTAL CA: CPT

## 2019-08-29 PROCEDURE — 99232 SBSQ HOSP IP/OBS MODERATE 35: CPT | Performed by: INTERNAL MEDICINE

## 2019-08-29 PROCEDURE — 36415 COLL VENOUS BLD VENIPUNCTURE: CPT

## 2019-08-29 PROCEDURE — 6360000002 HC RX W HCPCS: Performed by: PHYSICIAN ASSISTANT

## 2019-08-29 RX ORDER — AMLODIPINE BESYLATE 10 MG/1
10 TABLET ORAL DAILY
Status: DISCONTINUED | OUTPATIENT
Start: 2019-08-29 | End: 2019-08-29 | Stop reason: HOSPADM

## 2019-08-29 RX ORDER — CARVEDILOL 6.25 MG/1
6.25 TABLET ORAL 2 TIMES DAILY WITH MEALS
Status: DISCONTINUED | OUTPATIENT
Start: 2019-08-29 | End: 2019-08-29 | Stop reason: HOSPADM

## 2019-08-29 RX ORDER — AMLODIPINE BESYLATE 10 MG/1
10 TABLET ORAL DAILY
Qty: 30 TABLET | Refills: 3 | Status: SHIPPED | OUTPATIENT
Start: 2019-08-30

## 2019-08-29 RX ORDER — CARVEDILOL 6.25 MG/1
6.25 TABLET ORAL 2 TIMES DAILY WITH MEALS
Qty: 60 TABLET | Refills: 3 | Status: SHIPPED | OUTPATIENT
Start: 2019-08-29

## 2019-08-29 RX ADMIN — ASPIRIN 81 MG: 81 TABLET, COATED ORAL at 09:43

## 2019-08-29 RX ADMIN — AMLODIPINE BESYLATE 10 MG: 10 TABLET ORAL at 09:43

## 2019-08-29 RX ADMIN — LEVOTHYROXINE SODIUM 75 MCG: 75 TABLET ORAL at 06:57

## 2019-08-29 RX ADMIN — Medication 10 ML: at 09:43

## 2019-08-29 RX ADMIN — ENOXAPARIN SODIUM 80 MG: 80 INJECTION SUBCUTANEOUS at 09:43

## 2019-08-29 RX ADMIN — CARVEDILOL 6.25 MG: 6.25 TABLET, FILM COATED ORAL at 09:43

## 2019-08-29 RX ADMIN — HYDRALAZINE HYDROCHLORIDE 10 MG: 20 INJECTION INTRAMUSCULAR; INTRAVENOUS at 04:24

## 2019-08-29 ASSESSMENT — PAIN SCALES - GENERAL: PAINLEVEL_OUTOF10: 0

## 2019-08-29 NOTE — PROGRESS NOTES
11.6* 11.5*     Lab Results   Component Value Date    MG 1.8 08/27/2019     Recent Labs     08/26/19  0755 08/27/19  0550   ALKPHOS 115 106   ALT 22 21   AST 36 43*   PROT 7.5 6.9   BILITOT 0.8 0.7   LABALBU 4.1 3.2*     Recent Labs     08/26/19  0755 08/27/19  0550   WBC 12.9* 12.8*   RBC 4.53 4.35   HGB 13.5 13.1   HCT 40.5 39.3   MCV 89.4 90.3   MCH 29.8 30.1   MCHC 33.3 33.3   RDW 15.9* 16.0*    284   MPV 10.9 10.5     Lab Results   Component Value Date    CKTOTAL 140 08/26/2019    CKMB 3.4 03/09/2017    TROPONINI 0.03 08/26/2019    TROPONINI 0.04 (H) 08/26/2019    TROPONINI <0.01 08/26/2019     No results found for: INR, PROTIME  Lab Results   Component Value Date    TSH 2.400 08/26/2019     Lab Results   Component Value Date    LABA1C 4.9 03/09/2017     No results found for: EAG  Lab Results   Component Value Date    CHOL 120 03/09/2017     Lab Results   Component Value Date    TRIG 55 03/09/2017     Lab Results   Component Value Date    HDL 47 03/09/2017     Lab Results   Component Value Date    LDLCALC 62 03/09/2017     Lab Results   Component Value Date    LABVLDL 11 03/09/2017     No results found for: CHOLHDLRATIO    Cardiac Tests:  Telemetry findings reviewed: SB at rate 50s , no further episodes of AF      EKG: Atrial fibrillation with rapid ventricular response, left axis deviation, LVH, nonspecific ST-T changes, abnormal EKG. Labs were reviewed:Bun/creatinine 26/1.2>>22/1.3, electrolytes are normal, calcium 11.6, TSH 2.4, free T4 1.8, WBC 12.8>>12.8, H&H 13.5>>>13.1/39.3 and 40.5, platelets 782     KDK-69/8/2514: LVEF 65%, moderate MR, mild TR, severe AR, mild pulmonary hypertension.     Echo : LVEF 60%, stage II diastolic dysfunction, normal RV size and function, mild MR, moderate AR, mild TR and mild pulmonary hypertension with an RVSP of 47 mmHg.     BP: 189/82>> 168/68>>184/70    Assessment:  · Paroxysmal atrial fibrillation new onset with rapid ventricular response- converted spontaneously

## 2019-08-29 NOTE — PROGRESS NOTES
[facility] Quality Flow/Interdisciplinary Rounds Progress Note        Quality Flow Rounds held on August 29, 2019    Disciplines Attending:  Bedside Nurse, ,  and Nursing Unit Leadership    Caro Flannery was admitted on 8/26/2019  7:35 AM    Anticipated Discharge Date:  Expected Discharge Date: 08/29/19    Disposition:    Khadar Score:  Khadar Scale Score: 18    Readmission Risk              Risk of Unplanned Readmission:        8           Discussed patient goal for the day, patient clinical progression, and barriers to discharge.   The following Goal(s) of the Day/Commitment(s) have been identified:  discharge      Cintia Escamilla  August 29, 2019

## 2019-08-29 NOTE — PLAN OF CARE
Problem: FLUID AND ELECTROLYTE IMBALANCE  Goal: Fluid and electrolyte balance are achieved/maintained  8/29/2019 1224 by Socrates Schafer RN  Outcome: Completed  8/29/2019 0120 by Darrell Lin RN  Outcome: Met This Shift     Problem: HH FLUID RETENTION-CHF  Goal: Absence of fluid overload signs and symptoms  8/29/2019 1224 by Socrates Schafer RN  Outcome: Completed  8/29/2019 0120 by Darrell Lin RN  Outcome: Met This Shift     Problem: FALL RISK  Goal: Absence of falls  8/29/2019 1224 by Socrates Schafer RN  Outcome: Completed  8/29/2019 0120 by Darrell Lin RN  Outcome: Met This Shift     Problem: Nausea/Vomiting:  Goal: Absence of nausea/vomiting  Outcome: Completed     Problem:  Activity:  Goal: Ability to tolerate increased activity will improve  Outcome: Completed  Goal: Ability to implement measures to reduce episodes of fatigue will improve  Outcome: Completed  Goal: Expression of feelings of increased energy will increase  Outcome: Completed     Problem: Coping:  Goal: Ability to cope will improve  Outcome: Completed  Goal: Level of anxiety will decrease  Outcome: Completed  Goal: General experience of comfort will improve  Outcome: Completed     Problem: Health Behavior:  Goal: Adoption of positive health habits will improve  Outcome: Completed  Goal: Identification of resources available to assist in meeting health care needs will improve  Outcome: Completed  Goal: Ability to verbalize follow-up procedures will improve  Outcome: Completed  Goal: Ability to manage health-related needs will improve  Outcome: Completed     Problem: Nutritional:  Goal: Nutritional status will improve  Outcome: Completed     Problem: Safety:  Goal: Ability to remain free from injury will improve  Outcome: Completed     Problem: Cardiac:  Goal: Ability to maintain an adequate cardiac output will improve  8/29/2019 1224 by Socrates Schafer RN  Outcome: Completed  8/29/2019 0120 by Darrell Lin RN  Outcome: Met This Shift  Goal: Complications related to the disease process, condition or treatment will be avoided or minimized  Outcome: Completed     Problem: Emotional/Psychosocial  Goal: Understanding of community resources  Outcome: Completed

## 2019-09-04 ENCOUNTER — TELEPHONE (OUTPATIENT)
Dept: CARDIOLOGY CLINIC | Age: 84
End: 2019-09-04